# Patient Record
Sex: MALE | Race: WHITE | Employment: OTHER | URBAN - NONMETROPOLITAN AREA
[De-identification: names, ages, dates, MRNs, and addresses within clinical notes are randomized per-mention and may not be internally consistent; named-entity substitution may affect disease eponyms.]

---

## 2020-02-24 ENCOUNTER — HOSPITAL ENCOUNTER (OUTPATIENT)
Dept: GENERAL RADIOLOGY | Age: 85
Discharge: HOME | End: 2020-02-24
Payer: MEDICARE

## 2020-02-24 ENCOUNTER — HOSPITAL ENCOUNTER (OUTPATIENT)
Age: 85
Discharge: HOME | End: 2020-02-24
Payer: MEDICARE

## 2020-02-24 ENCOUNTER — OFFICE VISIT (OUTPATIENT)
Dept: CARDIOLOGY CLINIC | Age: 85
End: 2020-02-24
Payer: MEDICARE

## 2020-02-24 VITALS
DIASTOLIC BLOOD PRESSURE: 80 MMHG | HEIGHT: 74 IN | HEART RATE: 97 BPM | BODY MASS INDEX: 26.15 KG/M2 | SYSTOLIC BLOOD PRESSURE: 142 MMHG | WEIGHT: 203.8 LBS

## 2020-02-24 PROCEDURE — 99204 OFFICE O/P NEW MOD 45 MIN: CPT | Performed by: NUCLEAR MEDICINE

## 2020-02-24 PROCEDURE — 93000 ELECTROCARDIOGRAM COMPLETE: CPT | Performed by: NUCLEAR MEDICINE

## 2020-02-24 PROCEDURE — 71046 X-RAY EXAM CHEST 2 VIEWS: CPT

## 2020-02-24 RX ORDER — LEVOTHYROXINE SODIUM 50 UG/1
TABLET ORAL
COMMUNITY
Start: 2020-02-10

## 2020-02-24 ASSESSMENT — ENCOUNTER SYMPTOMS
ANAL BLEEDING: 0
BACK PAIN: 0
VOMITING: 0
PHOTOPHOBIA: 0
BLOOD IN STOOL: 0
SHORTNESS OF BREATH: 1
RECTAL PAIN: 0
DIARRHEA: 0
COLOR CHANGE: 0
ABDOMINAL DISTENTION: 0
CONSTIPATION: 0
NAUSEA: 0
CHEST TIGHTNESS: 1
ABDOMINAL PAIN: 0

## 2020-02-24 NOTE — PROGRESS NOTES
C/o occasional left side chest pain- pain 1/10, SOB, palpitations, fatigue, freq cough with white secretions increases after her eats    Pt Denies  HA, Dizziness, Swelling

## 2020-02-24 NOTE — PROGRESS NOTES
05/06/2002    Flu vaccine (1) 09/01/2019    Hepatitis A vaccine  Aged Out    Hepatitis B vaccine  Aged Out    Hib vaccine  Aged Out    Meningococcal (ACWY) vaccine  Aged Out       Subjective:  Review of Systems   Constitutional: Positive for fatigue. HENT: Negative for ear pain and nosebleeds. Eyes: Negative for photophobia. Respiratory: Positive for chest tightness and shortness of breath. Cardiovascular: Positive for chest pain. Negative for palpitations. Gastrointestinal: Negative for abdominal distention, abdominal pain, anal bleeding, blood in stool, constipation, diarrhea, nausea, rectal pain and vomiting. Endocrine: Negative for polyphagia. Genitourinary: Negative for dysuria, frequency and urgency. Musculoskeletal: Negative for arthralgias, back pain, gait problem, joint swelling, myalgias, neck pain and neck stiffness. Skin: Negative for color change, pallor, rash and wound. Allergic/Immunologic: Negative for food allergies. Neurological: Positive for dizziness. Negative for syncope and light-headedness. Hematological: Negative for adenopathy. Psychiatric/Behavioral: Negative for behavioral problems, confusion, decreased concentration, dysphoric mood and hallucinations. The patient is not hyperactive. Objective:  Physical Exam  HENT:      Head: Normocephalic. Nose: Nose normal.   Eyes:      Pupils: Pupils are equal, round, and reactive to light. Cardiovascular:      Rate and Rhythm: Normal rate and regular rhythm. Heart sounds: Murmur present. Pulmonary:      Effort: Pulmonary effort is normal. No respiratory distress. Breath sounds: Normal breath sounds. No stridor. No wheezing, rhonchi or rales. Chest:      Chest wall: No tenderness. Abdominal:      General: There is no distension. Palpations: There is no mass. Tenderness: There is no abdominal tenderness. There is no right CVA tenderness, left CVA tenderness, guarding or rebound. Hernia: No hernia is present. Musculoskeletal:         General: No swelling, tenderness, deformity or signs of injury. Right lower leg: No edema. Left lower leg: No edema. Skin:     Coloration: Skin is not jaundiced or pale. Findings: No bruising, erythema, lesion or rash. Neurological:      General: No focal deficit present. Mental Status: He is oriented to person, place, and time. Cranial Nerves: No cranial nerve deficit. Sensory: No sensory deficit. Motor: No weakness. Coordination: Coordination normal.      Gait: Gait normal.      Deep Tendon Reflexes: Reflexes normal.   Psychiatric:         Mood and Affect: Mood normal.         Thought Content: Thought content normal.         Judgment: Judgment normal.       BP (!) 156/100   Pulse 97   Ht 6' 2\" (1.88 m)   Wt 203 lb 12.8 oz (92.4 kg)   BMI 26.17 kg/m²     Assessment:      Diagnosis Orders   1. SOB (shortness of breath)  EKG 12 lead   2. Essential hypertension     concerning patient  Higher risk patient  Dyspnea as above  Previous abnormal stress test   Higher BP   ECG in office was done today. I reviewed the ECG. No acute findings      Plan:  No follow-ups on file. Discussed  Add beta blockers   Non invasive cardiac testing will be ordered to further evaluate for any ischemic or structural heart disease as a cause of the patient symptoms. We will proceed with a Stress Cardiolite test and echo soon. Cant walk a treadmill due to knee issues  ? ? Pulmonary   Does have decreased air entry   CXR   Continue risk factor modification and medical management  Thank you for allowing me to participate in the care of your patient. Please don't hesitate to contact me regarding any further issues related to the patient care    Orders Placed:  Orders Placed This Encounter   Procedures    EKG 12 lead     Order Specific Question:   Reason for Exam?     Answer:    Other       Medications Prescribed:  No orders of the defined

## 2020-03-06 ENCOUNTER — HOSPITAL ENCOUNTER (OUTPATIENT)
Dept: NON INVASIVE DIAGNOSTICS | Age: 85
Discharge: HOME | End: 2020-03-06
Payer: MEDICARE

## 2020-03-06 LAB
LV EF: 53 %
LVEF MODALITY: NORMAL

## 2020-03-06 PROCEDURE — 93017 CV STRESS TEST TRACING ONLY: CPT

## 2020-03-06 PROCEDURE — 93306 TTE W/DOPPLER COMPLETE: CPT

## 2020-03-06 PROCEDURE — 3430000000 HC RX DIAGNOSTIC RADIOPHARMACEUTICAL: Performed by: NUCLEAR MEDICINE

## 2020-03-06 PROCEDURE — 6360000002 HC RX W HCPCS

## 2020-03-06 PROCEDURE — 78452 HT MUSCLE IMAGE SPECT MULT: CPT

## 2020-03-06 PROCEDURE — A9500 TC99M SESTAMIBI: HCPCS | Performed by: NUCLEAR MEDICINE

## 2020-03-06 RX ADMIN — Medication 31.1 MILLICURIE: at 13:00

## 2020-03-06 RX ADMIN — Medication 8.7 MILLICURIE: at 11:55

## 2020-03-09 ENCOUNTER — TELEPHONE (OUTPATIENT)
Dept: CARDIOLOGY CLINIC | Age: 85
End: 2020-03-09

## 2020-03-19 ENCOUNTER — TELEPHONE (OUTPATIENT)
Dept: CARDIOLOGY CLINIC | Age: 85
End: 2020-03-19

## 2020-03-19 NOTE — TELEPHONE ENCOUNTER
I never received the review stress test on him  He was abnormal   I should have seen this a week ago !!!  Needs a cath  See if we can schedule the week I come back

## 2020-03-25 RX ORDER — OMEPRAZOLE 40 MG/1
40 CAPSULE, DELAYED RELEASE ORAL DAILY
COMMUNITY
End: 2020-05-28

## 2020-03-31 ENCOUNTER — PREP FOR PROCEDURE (OUTPATIENT)
Dept: CARDIOLOGY | Age: 85
End: 2020-03-31

## 2020-03-31 RX ORDER — SODIUM CHLORIDE 0.9 % (FLUSH) 0.9 %
10 SYRINGE (ML) INJECTION PRN
Status: CANCELLED | OUTPATIENT
Start: 2020-03-31

## 2020-03-31 RX ORDER — SODIUM CHLORIDE 0.9 % (FLUSH) 0.9 %
10 SYRINGE (ML) INJECTION EVERY 12 HOURS SCHEDULED
Status: CANCELLED | OUTPATIENT
Start: 2020-03-31

## 2020-03-31 RX ORDER — SODIUM CHLORIDE 9 MG/ML
INJECTION, SOLUTION INTRAVENOUS CONTINUOUS
Status: CANCELLED | OUTPATIENT
Start: 2020-03-31

## 2020-03-31 RX ORDER — DIPHENHYDRAMINE HYDROCHLORIDE 50 MG/ML
50 INJECTION INTRAMUSCULAR; INTRAVENOUS ONCE
Status: CANCELLED | OUTPATIENT
Start: 2020-03-31 | End: 2020-03-31

## 2020-03-31 RX ORDER — ASPIRIN 325 MG
325 TABLET ORAL ONCE
Status: CANCELLED | OUTPATIENT
Start: 2020-03-31 | End: 2020-03-31

## 2020-03-31 RX ORDER — NITROGLYCERIN 0.4 MG/1
0.4 TABLET SUBLINGUAL EVERY 5 MIN PRN
Status: CANCELLED | OUTPATIENT
Start: 2020-03-31

## 2020-04-01 ENCOUNTER — HOSPITAL ENCOUNTER (OUTPATIENT)
Dept: INPATIENT UNIT | Age: 85
Discharge: HOME | End: 2020-04-01
Attending: NUCLEAR MEDICINE | Admitting: NUCLEAR MEDICINE
Payer: MEDICARE

## 2020-04-01 VITALS
BODY MASS INDEX: 25.93 KG/M2 | HEIGHT: 74 IN | SYSTOLIC BLOOD PRESSURE: 104 MMHG | TEMPERATURE: 97.6 F | DIASTOLIC BLOOD PRESSURE: 54 MMHG | RESPIRATION RATE: 18 BRPM | WEIGHT: 202 LBS | HEART RATE: 55 BPM | OXYGEN SATURATION: 96 %

## 2020-04-01 LAB
ABO: NORMAL
ANION GAP SERPL CALCULATED.3IONS-SCNC: 17 MEQ/L (ref 8–16)
ANTIBODY SCREEN: NORMAL
APTT: 32.5 SECONDS (ref 22–38)
BUN BLDV-MCNC: 23 MG/DL (ref 7–22)
CALCIUM SERPL-MCNC: 10 MG/DL (ref 8.5–10.5)
CHLORIDE BLD-SCNC: 100 MEQ/L (ref 98–111)
CHOLESTEROL, TOTAL: 182 MG/DL (ref 100–199)
CO2: 24 MEQ/L (ref 23–33)
CREAT SERPL-MCNC: 1.5 MG/DL (ref 0.4–1.2)
EKG ATRIAL RATE: 62 BPM
EKG P AXIS: 49 DEGREES
EKG P-R INTERVAL: 150 MS
EKG Q-T INTERVAL: 434 MS
EKG QRS DURATION: 106 MS
EKG QTC CALCULATION (BAZETT): 440 MS
EKG R AXIS: -44 DEGREES
EKG T AXIS: 44 DEGREES
EKG VENTRICULAR RATE: 62 BPM
ERYTHROCYTE [DISTWIDTH] IN BLOOD BY AUTOMATED COUNT: 14.4 % (ref 11.5–14.5)
ERYTHROCYTE [DISTWIDTH] IN BLOOD BY AUTOMATED COUNT: 47.7 FL (ref 35–45)
GFR SERPL CREATININE-BSD FRML MDRD: 45 ML/MIN/1.73M2
GLUCOSE BLD-MCNC: 162 MG/DL (ref 70–108)
HCT VFR BLD CALC: 53.7 % (ref 42–52)
HDLC SERPL-MCNC: 46 MG/DL
HEMOGLOBIN: 17.2 GM/DL (ref 14–18)
INR BLD: 0.99 (ref 0.85–1.13)
LDL CHOLESTEROL CALCULATED: 114 MG/DL
MCH RBC QN AUTO: 29.1 PG (ref 26–33)
MCHC RBC AUTO-ENTMCNC: 32 GM/DL (ref 32.2–35.5)
MCV RBC AUTO: 90.7 FL (ref 80–94)
PLATELET # BLD: 195 THOU/MM3 (ref 130–400)
PMV BLD AUTO: 9.4 FL (ref 9.4–12.4)
POTASSIUM REFLEX MAGNESIUM: 4.5 MEQ/L (ref 3.5–5.2)
RBC # BLD: 5.92 MILL/MM3 (ref 4.7–6.1)
RH FACTOR: NORMAL
SODIUM BLD-SCNC: 141 MEQ/L (ref 135–145)
TRIGL SERPL-MCNC: 109 MG/DL (ref 0–199)
WBC # BLD: 9.7 THOU/MM3 (ref 4.8–10.8)

## 2020-04-01 PROCEDURE — 93010 ELECTROCARDIOGRAM REPORT: CPT | Performed by: NUCLEAR MEDICINE

## 2020-04-01 PROCEDURE — 85027 COMPLETE CBC AUTOMATED: CPT

## 2020-04-01 PROCEDURE — 2709999900 HC NON-CHARGEABLE SUPPLY

## 2020-04-01 PROCEDURE — 85730 THROMBOPLASTIN TIME PARTIAL: CPT

## 2020-04-01 PROCEDURE — C1725 CATH, TRANSLUMIN NON-LASER: HCPCS

## 2020-04-01 PROCEDURE — 93458 L HRT ARTERY/VENTRICLE ANGIO: CPT | Performed by: NUCLEAR MEDICINE

## 2020-04-01 PROCEDURE — 2500000003 HC RX 250 WO HCPCS

## 2020-04-01 PROCEDURE — C1894 INTRO/SHEATH, NON-LASER: HCPCS

## 2020-04-01 PROCEDURE — 6360000002 HC RX W HCPCS

## 2020-04-01 PROCEDURE — 36415 COLL VENOUS BLD VENIPUNCTURE: CPT

## 2020-04-01 PROCEDURE — 86901 BLOOD TYPING SEROLOGIC RH(D): CPT

## 2020-04-01 PROCEDURE — 86850 RBC ANTIBODY SCREEN: CPT

## 2020-04-01 PROCEDURE — 6370000000 HC RX 637 (ALT 250 FOR IP): Performed by: NURSE PRACTITIONER

## 2020-04-01 PROCEDURE — 93005 ELECTROCARDIOGRAM TRACING: CPT | Performed by: NURSE PRACTITIONER

## 2020-04-01 PROCEDURE — 85610 PROTHROMBIN TIME: CPT

## 2020-04-01 PROCEDURE — C1769 GUIDE WIRE: HCPCS

## 2020-04-01 PROCEDURE — 80048 BASIC METABOLIC PNL TOTAL CA: CPT

## 2020-04-01 PROCEDURE — 86900 BLOOD TYPING SEROLOGIC ABO: CPT

## 2020-04-01 PROCEDURE — 6360000004 HC RX CONTRAST MEDICATION: Performed by: NUCLEAR MEDICINE

## 2020-04-01 PROCEDURE — 80061 LIPID PANEL: CPT

## 2020-04-01 PROCEDURE — 2580000003 HC RX 258: Performed by: NURSE PRACTITIONER

## 2020-04-01 RX ORDER — NITROGLYCERIN 0.4 MG/1
0.4 TABLET SUBLINGUAL EVERY 5 MIN PRN
Status: DISCONTINUED | OUTPATIENT
Start: 2020-04-01 | End: 2020-04-01 | Stop reason: HOSPADM

## 2020-04-01 RX ORDER — ASPIRIN 325 MG
325 TABLET ORAL ONCE
Status: COMPLETED | OUTPATIENT
Start: 2020-04-01 | End: 2020-04-01

## 2020-04-01 RX ORDER — ATROPINE SULFATE 0.4 MG/ML
0.5 AMPUL (ML) INJECTION
Status: DISCONTINUED | OUTPATIENT
Start: 2020-04-01 | End: 2020-04-01 | Stop reason: HOSPADM

## 2020-04-01 RX ORDER — SODIUM CHLORIDE 9 MG/ML
INJECTION, SOLUTION INTRAVENOUS CONTINUOUS
Status: DISCONTINUED | OUTPATIENT
Start: 2020-04-01 | End: 2020-04-01 | Stop reason: HOSPADM

## 2020-04-01 RX ORDER — ACETAMINOPHEN 325 MG/1
650 TABLET ORAL EVERY 4 HOURS PRN
Status: DISCONTINUED | OUTPATIENT
Start: 2020-04-01 | End: 2020-04-01 | Stop reason: HOSPADM

## 2020-04-01 RX ORDER — SODIUM CHLORIDE 0.9 % (FLUSH) 0.9 %
10 SYRINGE (ML) INJECTION PRN
Status: DISCONTINUED | OUTPATIENT
Start: 2020-04-01 | End: 2020-04-01 | Stop reason: SDUPTHER

## 2020-04-01 RX ORDER — SODIUM CHLORIDE 0.9 % (FLUSH) 0.9 %
10 SYRINGE (ML) INJECTION EVERY 12 HOURS SCHEDULED
Status: DISCONTINUED | OUTPATIENT
Start: 2020-04-01 | End: 2020-04-01 | Stop reason: HOSPADM

## 2020-04-01 RX ORDER — SODIUM CHLORIDE 9 MG/ML
INJECTION, SOLUTION INTRAVENOUS CONTINUOUS
Status: DISCONTINUED | OUTPATIENT
Start: 2020-04-01 | End: 2020-04-01 | Stop reason: ALTCHOICE

## 2020-04-01 RX ORDER — SODIUM CHLORIDE 0.9 % (FLUSH) 0.9 %
10 SYRINGE (ML) INJECTION PRN
Status: DISCONTINUED | OUTPATIENT
Start: 2020-04-01 | End: 2020-04-01 | Stop reason: HOSPADM

## 2020-04-01 RX ADMIN — IOPAMIDOL 135 ML: 755 INJECTION, SOLUTION INTRAVENOUS at 14:00

## 2020-04-01 RX ADMIN — SODIUM CHLORIDE: 9 INJECTION, SOLUTION INTRAVENOUS at 11:09

## 2020-04-01 RX ADMIN — ASPIRIN 325 MG: 325 TABLET, FILM COATED ORAL at 11:25

## 2020-04-01 NOTE — PLAN OF CARE
Problem: Discharge Planning:  Goal: Participates in care planning  Description: Participates in care planning  Outcome: Ongoing   Care plan reviewed with patient and wife. Patient and wife verbalize understanding of the plan of care and contribute to goal setting.

## 2020-04-01 NOTE — PROGRESS NOTES
2 ml air withdrawn from vasc band - small amt of bleeding occurred. 2 ml air reinjected into vasc band with cessaton of bleeding.   Site soft, no hematoma

## 2020-05-28 ENCOUNTER — OFFICE VISIT (OUTPATIENT)
Dept: CARDIOLOGY CLINIC | Age: 85
End: 2020-05-28
Payer: MEDICARE

## 2020-05-28 VITALS
BODY MASS INDEX: 26.69 KG/M2 | WEIGHT: 208 LBS | SYSTOLIC BLOOD PRESSURE: 132 MMHG | HEIGHT: 74 IN | HEART RATE: 68 BPM | DIASTOLIC BLOOD PRESSURE: 80 MMHG

## 2020-05-28 PROCEDURE — 99213 OFFICE O/P EST LOW 20 MIN: CPT | Performed by: NUCLEAR MEDICINE

## 2020-05-28 NOTE — PROGRESS NOTES
[address] [address]  Dept: [phone]  Dept Fax: [phone]  Loc: [phone]    Visit Date: 2020    Zandra Cole is a 82 y.o. male who presents todayfor:  Chief Complaint   Patient presents with    Check-Up    Shortness of Breath    Coronary Artery Disease   known CAD  Cath lately   Mild non obstructive CAD  Issues with sleeping disturbance from meds   No cath complication  No bleeding       HPI:  HPI  Past Medical History:   Diagnosis Date    Acid reflux 2019    Arthritis     general    Diabetes mellitus ([address]     Hypertension     SOB (shortness of breath)     Thyroid disease       Past Surgical History:   Procedure Laterality Date    DENTAL SURGERY      FINGER SURGERY      NECK SURGERY       Family History   Problem Relation Age of Onset    Cancer Mother     Emphysema Father     Heart Disease Brother     Cancer Brother      Social History     Tobacco Use    Smoking status: Former Smoker     Last attempt to quit: 1980     Years since quittin.4    Smokeless tobacco: Never Used   Substance Use Topics    Alcohol use: Not Currently     Comment: rare      Current Outpatient Medications   Medication Sig Dispense Refill    Carboxymethylcellulose Sodium (EYE DROPS OP) Apply to eye      EUTHYROX 50 MCG tablet TAKE 1 TABLET BY MOUTH ONCE DAILY IN THE MORNING ON AN EMPTY STOMACH      metoprolol tartrate (LOPRESSOR) 25 MG tablet Take 1 tablet by mouth 2 times daily 60 tablet 11     No current facility-administered medications for this visit.       No Known Allergies  Health Maintenance   Topic Date Due    DTaP/Tdap/Td vaccine (1 - Tdap) 1956    Shingles Vaccine (1 of 2) 1987    Pneumococcal 65+ years Vaccine (1 of 1 - PPSV23) 2002    Annual Wellness Visit (AWV)  2020    Flu vaccine (Season Ended) 2020    Hepatitis A vaccine  Aged Out    Hepatitis B vaccine  Aged Out    Hib vaccine  Aged Out    Meningococcal (ACWY) vaccine  Aged Out       Subjective:  Review of Systems  General:   No fever, no chills, No fatigue or weight loss  Pulmonary:    some dyspnea, no wheezing  Cardiac:    Denies recent chest pain,   GI:     No nausea or vomiting, no abdominal pain  Neuro:    No dizziness or light headedness,   Musculoskeletal:  No recent active issues  Extremities:   No edema, no obvious claudication       Objective:  Physical Exam  /80   Pulse 68   Ht 6' 2\" (1.88 m)   Wt 208 lb (94.3 kg)   BMI 26.71 kg/m²   General:   Well developed, well nourished  Lungs:   Clear to auscultation  Heart:    Normal S1 S2, Slight murmur. no rubs, no gallops  Abdomen:   Soft, non tender, no organomegalies, positive bowel sounds  Extremities:   No edema, no cyanosis, good peripheral pulses  Neurological:   Awake, alert, oriented. No obvious focal deficits  Musculoskelatal:  No obvious deformities    Assessment:      Diagnosis Orders   1. Coronary artery disease involving native coronary artery of native heart without angina pectoris     2. Familial hypercholesterolemia     cardiac fair now     Plan:  No follow-ups on file. Change toprol or atenolol for once a day   Continue risk factor modification and medical management  Thank you for allowing me to participate in the care of your patient. Please don't hesitate to contact me regarding any further issues related to the patient care    Orders Placed:  No orders of the defined types were placed in this encounter. Medications Prescribed:  No orders of the defined types were placed in this encounter. Discussed use, benefit, and side effects of prescribed medications. All patient questions answered. Pt voicedunderstanding. Instructed to continue current medications, diet and exercise. Continue risk factor modification and medical management. Patient agreed with treatment plan. Follow up as directed.     Electronically

## 2020-05-28 NOTE — PROGRESS NOTES
Needs cataract removed on 6-8-2020 with Dr. Vincenzo Wilson. Patient's BP before eye surgery was 168/104 and 175/108.

## 2020-06-01 ENCOUNTER — TELEPHONE (OUTPATIENT)
Dept: CARDIOLOGY CLINIC | Age: 85
End: 2020-06-01

## 2020-07-20 ENCOUNTER — TELEPHONE (OUTPATIENT)
Dept: CARDIOLOGY CLINIC | Age: 85
End: 2020-07-20

## 2021-05-28 ENCOUNTER — OFFICE VISIT (OUTPATIENT)
Dept: CARDIOLOGY CLINIC | Age: 86
End: 2021-05-28
Payer: MEDICARE

## 2021-05-28 VITALS
BODY MASS INDEX: 27.11 KG/M2 | WEIGHT: 211.2 LBS | DIASTOLIC BLOOD PRESSURE: 88 MMHG | SYSTOLIC BLOOD PRESSURE: 157 MMHG | HEIGHT: 74 IN | HEART RATE: 60 BPM

## 2021-05-28 DIAGNOSIS — R06.02 SOB (SHORTNESS OF BREATH) ON EXERTION: ICD-10-CM

## 2021-05-28 DIAGNOSIS — I25.10 CORONARY ARTERY DISEASE INVOLVING NATIVE CORONARY ARTERY OF NATIVE HEART WITHOUT ANGINA PECTORIS: Primary | ICD-10-CM

## 2021-05-28 PROCEDURE — 93000 ELECTROCARDIOGRAM COMPLETE: CPT | Performed by: NUCLEAR MEDICINE

## 2021-05-28 PROCEDURE — 99213 OFFICE O/P EST LOW 20 MIN: CPT | Performed by: NUCLEAR MEDICINE

## 2021-05-28 RX ORDER — FAMOTIDINE 40 MG/1
40 TABLET, FILM COATED ORAL DAILY
COMMUNITY
End: 2022-05-27

## 2022-05-27 ENCOUNTER — OFFICE VISIT (OUTPATIENT)
Dept: CARDIOLOGY CLINIC | Age: 87
End: 2022-05-27
Payer: MEDICARE

## 2022-05-27 VITALS
BODY MASS INDEX: 26.69 KG/M2 | DIASTOLIC BLOOD PRESSURE: 95 MMHG | HEART RATE: 68 BPM | HEIGHT: 74 IN | SYSTOLIC BLOOD PRESSURE: 150 MMHG | WEIGHT: 208 LBS

## 2022-05-27 DIAGNOSIS — I10 PRIMARY HYPERTENSION: ICD-10-CM

## 2022-05-27 DIAGNOSIS — R06.02 SOB (SHORTNESS OF BREATH) ON EXERTION: ICD-10-CM

## 2022-05-27 DIAGNOSIS — I25.10 CORONARY ARTERY DISEASE INVOLVING NATIVE CORONARY ARTERY OF NATIVE HEART WITHOUT ANGINA PECTORIS: Primary | ICD-10-CM

## 2022-05-27 PROCEDURE — 1123F ACP DISCUSS/DSCN MKR DOCD: CPT | Performed by: NUCLEAR MEDICINE

## 2022-05-27 PROCEDURE — 99213 OFFICE O/P EST LOW 20 MIN: CPT | Performed by: NUCLEAR MEDICINE

## 2022-05-27 PROCEDURE — 93000 ELECTROCARDIOGRAM COMPLETE: CPT | Performed by: NUCLEAR MEDICINE

## 2022-05-27 RX ORDER — AZITHROMYCIN 250 MG/1
TABLET, FILM COATED ORAL
COMMUNITY
Start: 2022-05-25

## 2022-05-27 NOTE — PROGRESS NOTES
[id number] [facility] [address] [address]  Dept: [phone]  Dept Fax: [phone]  Loc: [phone]    Visit Date: 2022    Rosana Lesches is a 82 y.o. male who presents todayfor:  Chief Complaint   Patient presents with    1 Year Follow Up    Hypertension    Coronary Artery Disease     Know mild CAD  No chest pain   Some baseline dyspnea  No other issues  BP is stable  No dizziness  No syncope      HPI:  HPI  Past Medical History:   Diagnosis Date    Acid reflux 2019    Arthritis     general    Diabetes mellitus ([address]     Hypertension     SOB (shortness of breath)     Thyroid disease       Past Surgical History:   Procedure Laterality Date    DENTAL SURGERY      FINGER SURGERY      NECK SURGERY       Family History   Problem Relation Age of Onset    Cancer Mother     Emphysema Father     Heart Disease Brother     Cancer Brother      Social History     Tobacco Use    Smoking status: Former Smoker     Quit date: 1980     Years since quittin.4    Smokeless tobacco: Never Used   Substance Use Topics    Alcohol use: Not Currently     Comment: rare      Current Outpatient Medications   Medication Sig Dispense Refill    Lactobacillus (PROBIOTIC ACIDOPHILUS PO) Take by mouth      azithromycin (ZITHROMAX) 250 MG tablet TAKE 2 TABLETS BY MOUTH ON DAY 1, THEN TAKE 1 TABLET DAILY ON DAYS 2-5      metoprolol tartrate (LOPRESSOR) 25 MG tablet Take 1 tablet by mouth 2 times daily 60 tablet 11    EUTHYROX 50 MCG tablet TAKE 1 TABLET BY MOUTH ONCE DAILY IN THE MORNING ON AN EMPTY STOMACH       No current facility-administered medications for this visit.      No Known Allergies  Health Maintenance   Topic Date Due    Annual Wellness Visit (AWV)  Never done    COVID-19 Vaccine (1) Never done    Depression Screen  Never done    DTaP/Tdap/Td vaccine (1 - Tdap) Never done    Shingles vaccine (1 of 2) Never done  Pneumococcal 65+ years Vaccine (1 - PCV) Never done    Flu vaccine (Season Ended) 09/01/2022    Hepatitis A vaccine  Aged Out    Hepatitis B vaccine  Aged Out    Hib vaccine  Aged Out    Meningococcal (ACWY) vaccine  Aged Out       Subjective:  Review of Systems  General:   No fever, no chills, No fatigue or weight loss  Pulmonary:    No dyspnea, no wheezing  Cardiac:    Denies recent chest pain,   GI:     No nausea or vomiting, no abdominal pain  Neuro:    No dizziness or light headedness,   Musculoskeletal:  No recent active issues  Extremities:   No edema, no obvious claudication       Objective:  Physical Exam  BP (!) 150/95   Pulse 68   Ht 6' 2\" (1.88 m)   Wt 208 lb (94.3 kg)   BMI 26.71 kg/m²   General:   Well developed, well nourished  Lungs:   Clear to auscultation  Heart:    Normal S1 S2, Slight murmur. no rubs, no gallops  Abdomen:   Soft, non tender, no organomegalies, positive bowel sounds  Extremities:   No edema, no cyanosis, good peripheral pulses  Neurological:   Awake, alert, oriented. No obvious focal deficits  Musculoskelatal:  No obvious deformities    Assessment:      Diagnosis Orders   1. Coronary artery disease involving native coronary artery of native heart without angina pectoris  EKG 12 lead   2. SOB (shortness of breath) on exertion  EKG 12 lead   3. Primary hypertension     as above  Cardiac fair for now  ECG in office was done today. I reviewed the ECG. No acute findings    Plan:  As above  Continue risk factor modification and medical management  Thank you for allowing me to participate in the care of your patient. Please don't hesitate to contact me regarding any further issues related to the patient care      Orders Placed:  Orders Placed This Encounter   Procedures    EKG 12 lead     Order Specific Question:   Reason for Exam?     Answer: Other       Medications Prescribed:  No orders of the defined types were placed in this encounter.          Discussed use, benefit, and side effects of prescribed medications. All patient questions answered. Pt voicedunderstanding. Instructed to continue current medications, diet and exercise. Continue risk factor modification and medical management. Patient agreed with treatment plan. Follow up as directed.     Electronically signedby Sol Kruger MD on 5/27/2022 at 12:47 PM

## 2023-06-02 ENCOUNTER — OFFICE VISIT (OUTPATIENT)
Dept: CARDIOLOGY CLINIC | Age: 88
End: 2023-06-02
Payer: MEDICARE

## 2023-06-02 VITALS
HEART RATE: 61 BPM | DIASTOLIC BLOOD PRESSURE: 97 MMHG | WEIGHT: 206 LBS | BODY MASS INDEX: 26.44 KG/M2 | SYSTOLIC BLOOD PRESSURE: 163 MMHG | HEIGHT: 74 IN

## 2023-06-02 DIAGNOSIS — I10 PRIMARY HYPERTENSION: ICD-10-CM

## 2023-06-02 DIAGNOSIS — R06.02 SOB (SHORTNESS OF BREATH) ON EXERTION: ICD-10-CM

## 2023-06-02 DIAGNOSIS — I25.10 CORONARY ARTERY DISEASE INVOLVING NATIVE CORONARY ARTERY OF NATIVE HEART WITHOUT ANGINA PECTORIS: Primary | ICD-10-CM

## 2023-06-02 PROCEDURE — 1123F ACP DISCUSS/DSCN MKR DOCD: CPT | Performed by: NUCLEAR MEDICINE

## 2023-06-02 PROCEDURE — 99213 OFFICE O/P EST LOW 20 MIN: CPT | Performed by: NUCLEAR MEDICINE

## 2023-06-02 PROCEDURE — 93000 ELECTROCARDIOGRAM COMPLETE: CPT | Performed by: NUCLEAR MEDICINE

## 2023-06-02 NOTE — PROGRESS NOTES
[id number] [facility] .  [address] [address]  Dept: [phone]  Dept Fax: [phone]  Loc: [phone]    Visit Date: 2023    Gloria Machado is a 82 y.o. male who presents todayfor:  Chief Complaint   Patient presents with    1 Year Follow Up    Coronary Artery Disease    Hypertension     Know mild CAD  No chest pain  No changes in breathing  BP is stable  No dizziness  No syncope      HPI:  HPI  Past Medical History:   Diagnosis Date    Acid reflux 2019    Arthritis     general    Diabetes mellitus (HCC)     Hypertension     SOB (shortness of breath)     Thyroid disease       Past Surgical History:   Procedure Laterality Date    DENTAL SURGERY      FINGER SURGERY      NECK SURGERY       Family History   Problem Relation Age of Onset    Cancer Mother     Emphysema Father     Heart Disease Brother     Cancer Brother      Social History     Tobacco Use    Smoking status: Former     Types: Cigarettes     Quit date: 1980     Years since quittin.4    Smokeless tobacco: Never   Substance Use Topics    Alcohol use: Not Currently     Comment: rare      Current Outpatient Medications   Medication Sig Dispense Refill    metoprolol tartrate (LOPRESSOR) 25 MG tablet Take 1 tablet by mouth 2 times daily 60 tablet 11    Lactobacillus (PROBIOTIC ACIDOPHILUS PO) Take by mouth      azithromycin (ZITHROMAX) 250 MG tablet TAKE 2 TABLETS BY MOUTH ON DAY 1, THEN TAKE 1 TABLET DAILY ON DAYS 2-5      EUTHYROX 50 MCG tablet TAKE 1 TABLET BY MOUTH ONCE DAILY IN THE MORNING ON AN EMPTY STOMACH       No current facility-administered medications for this visit.      No Known Allergies  Health Maintenance   Topic Date Due    COVID-19 Vaccine (1) Never done    Depression Screen  Never done    DTaP/Tdap/Td vaccine (1 - Tdap) Never done    Shingles vaccine (1 of 2) Never done    Pneumococcal 65+ years Vaccine (1 - PCV) Never done    Annual Wellness Visit

## 2024-06-04 NOTE — PROGRESS NOTES
Bed: 12  Expected date:   Expected time:   Means of arrival:   Comments:  Lou Vickers    done    Pneumococcal 65+ years Vaccine (1 of 1 - PPSV23) Never done   [employer] Visit (AWV)  Never done    Flu vaccine (Season Ended) 09/01/2021    Hepatitis A vaccine  Aged Out    Hepatitis B vaccine  Aged Out    Hib vaccine  Aged Out    Meningococcal (ACWY) vaccine  Aged Out       Subjective:  Review of Systems  General:   No fever, no chills, No fatigue or weight loss  Pulmonary:    No dyspnea, no wheezing  Cardiac:    Denies recent chest pain,   GI:     No nausea or vomiting, no abdominal pain  Neuro:    No dizziness or light headedness,   Musculoskeletal:  No recent active issues  Extremities:   No edema, no obvious claudication       Objective:  Physical Exam  BP (!) 157/88   Pulse 60   Ht 6' 2\" (1.88 m)   Wt 211 lb 3.2 oz (95.8 kg)   BMI 27.12 kg/m²   General:   Well developed, well nourished  Lungs:   Clear to auscultation  Heart:    Normal S1 S2, Slight murmur. no rubs, no gallops  Abdomen:   Soft, non tender, no organomegalies, positive bowel sounds  Extremities:   No edema, no cyanosis, good peripheral pulses  Neurological:   Awake, alert, oriented. No obvious focal deficits  Musculoskelatal:  No obvious deformities    Assessment:      Diagnosis Orders   1. Coronary artery disease involving native coronary artery of native heart without angina pectoris  EKG 12 lead   2. SOB (shortness of breath) on exertion  EKG 12 lead   as above  Cardiac fair for now   ECG in office was done today. I reviewed the ECG. No acute findings    Plan:  No follow-ups on file. As above  Continue risk factor modification and medical management  Thank you for allowing me to participate in the care of your patient. Please don't hesitate to contact me regarding any further issues related to the patient care    Orders Placed:  Orders Placed This Encounter   Procedures    EKG 12 lead     Order Specific Question:   Reason for Exam?     Answer:    Other       Medications Prescribed:  No orders of the defined types were placed in this encounter. Discussed use, benefit, and side effects of prescribed medications. All patient questions answered. Pt voicedunderstanding. Instructed to continue current medications, diet and exercise. Continue risk factor modification and medical management. Patient agreed with treatment plan. Follow up as directed.     Electronically signedby Laura Echavarria MD on 5/28/2021 at 12:59 PM

## 2024-06-07 ENCOUNTER — OFFICE VISIT (OUTPATIENT)
Dept: CARDIOLOGY CLINIC | Age: 89
End: 2024-06-07
Payer: MEDICARE

## 2024-06-07 VITALS
HEART RATE: 64 BPM | WEIGHT: 210.8 LBS | HEIGHT: 74 IN | SYSTOLIC BLOOD PRESSURE: 174 MMHG | BODY MASS INDEX: 27.05 KG/M2 | DIASTOLIC BLOOD PRESSURE: 88 MMHG

## 2024-06-07 DIAGNOSIS — R06.02 SOB (SHORTNESS OF BREATH) ON EXERTION: Primary | ICD-10-CM

## 2024-06-07 DIAGNOSIS — I10 PRIMARY HYPERTENSION: ICD-10-CM

## 2024-06-07 PROCEDURE — 93000 ELECTROCARDIOGRAM COMPLETE: CPT | Performed by: NUCLEAR MEDICINE

## 2024-06-07 PROCEDURE — G8419 CALC BMI OUT NRM PARAM NOF/U: HCPCS | Performed by: NUCLEAR MEDICINE

## 2024-06-07 PROCEDURE — 1036F TOBACCO NON-USER: CPT | Performed by: NUCLEAR MEDICINE

## 2024-06-07 PROCEDURE — 99213 OFFICE O/P EST LOW 20 MIN: CPT | Performed by: NUCLEAR MEDICINE

## 2024-06-07 PROCEDURE — 1123F ACP DISCUSS/DSCN MKR DOCD: CPT | Performed by: NUCLEAR MEDICINE

## 2024-06-07 PROCEDURE — G8427 DOCREV CUR MEDS BY ELIG CLIN: HCPCS | Performed by: NUCLEAR MEDICINE

## 2024-06-07 RX ORDER — PANTOPRAZOLE SODIUM 20 MG/1
20 TABLET, DELAYED RELEASE ORAL DAILY
COMMUNITY

## 2024-06-07 NOTE — PROGRESS NOTES
Has been taking his Metoprolol differently. States the BID was not tolerated so he is only taking 25mg once daily in the morning.     
medications for this visit.     No Known Allergies  Health Maintenance   Topic Date Due    COVID-19 Vaccine (1) Never done    Depression Screen  Never done    DTaP/Tdap/Td vaccine (1 - Tdap) Never done    Shingles vaccine (1 of 2) Never done    Respiratory Syncytial Virus (RSV) Pregnant or age 60 yrs+ (1 - 1-dose 60+ series) Never done    Pneumococcal 65+ years Vaccine (1 of 1 - PCV) Never done    Annual Wellness Visit (Medicare Advantage)  Never done    Flu vaccine (Season Ended) 08/01/2024    Hepatitis A vaccine  Aged Out    Hepatitis B vaccine  Aged Out    Hib vaccine  Aged Out    Polio vaccine  Aged Out    Meningococcal (ACWY) vaccine  Aged Out       Subjective:  General:   No fever, no chills, No fatigue or weight loss  Pulmonary:    No dyspnea, no wheezing  Cardiac:    Denies recent chest pain,   GI:     No nausea or vomiting, no abdominal pain  Neuro:    No dizziness or light headedness,   Musculoskeletal:  No recent active issues  Extremities:   No edema, no obvious claudication       Objective:  General:   Well developed, well nourished  Lungs:   Clear to auscultation  Heart:    Normal S1 S2, Slight murmur. no rubs, no gallops  Abdomen:   Soft, non tender, no organomegalies, positive bowel sounds  Extremities:   No edema, no cyanosis, good peripheral pulses  Neurological:   Awake, alert, oriented. No obvious focal deficits  Musculoskelatal:  No obvious deformities   BP (!) 174/88   Pulse 64   Ht 1.88 m (6' 2\")   Wt 95.6 kg (210 lb 12.8 oz)   BMI 27.07 kg/m²     Assessment:  Assessment & Plan    Diagnosis Orders   1. SOB (shortness of breath) on exertion  EKG 12 lead      2. Primary hypertension  EKG 12 lead      As above  Cardiac fair for now   ECG in office was done today. I reviewed the ECG. No acute findings    Plan:  No follow-ups on file.  As above  Continue risk factor modification and medical management  Thank you for allowing me to participate in the care of your patient. Please don't hesitate

## 2025-06-03 ENCOUNTER — OFFICE VISIT (OUTPATIENT)
Dept: CARDIOLOGY CLINIC | Age: 89
End: 2025-06-03
Payer: MEDICARE

## 2025-06-03 VITALS
WEIGHT: 194.3 LBS | HEIGHT: 74 IN | SYSTOLIC BLOOD PRESSURE: 136 MMHG | DIASTOLIC BLOOD PRESSURE: 68 MMHG | BODY MASS INDEX: 24.93 KG/M2 | HEART RATE: 77 BPM

## 2025-06-03 DIAGNOSIS — I10 PRIMARY HYPERTENSION: ICD-10-CM

## 2025-06-03 DIAGNOSIS — I25.10 CORONARY ARTERY DISEASE INVOLVING NATIVE CORONARY ARTERY OF NATIVE HEART WITHOUT ANGINA PECTORIS: Primary | ICD-10-CM

## 2025-06-03 PROCEDURE — 1159F MED LIST DOCD IN RCRD: CPT | Performed by: PHYSICIAN ASSISTANT

## 2025-06-03 PROCEDURE — 1036F TOBACCO NON-USER: CPT | Performed by: PHYSICIAN ASSISTANT

## 2025-06-03 PROCEDURE — 99214 OFFICE O/P EST MOD 30 MIN: CPT | Performed by: PHYSICIAN ASSISTANT

## 2025-06-03 PROCEDURE — 93000 ELECTROCARDIOGRAM COMPLETE: CPT | Performed by: PHYSICIAN ASSISTANT

## 2025-06-03 PROCEDURE — G8427 DOCREV CUR MEDS BY ELIG CLIN: HCPCS | Performed by: PHYSICIAN ASSISTANT

## 2025-06-03 PROCEDURE — 1123F ACP DISCUSS/DSCN MKR DOCD: CPT | Performed by: PHYSICIAN ASSISTANT

## 2025-06-03 PROCEDURE — G8420 CALC BMI NORM PARAMETERS: HCPCS | Performed by: PHYSICIAN ASSISTANT

## 2025-06-03 RX ORDER — ATORVASTATIN CALCIUM 20 MG/1
20 TABLET, FILM COATED ORAL DAILY
COMMUNITY
Start: 2025-05-13

## 2025-06-03 RX ORDER — METFORMIN HYDROCHLORIDE 750 MG/1
TABLET, EXTENDED RELEASE ORAL
COMMUNITY
Start: 2025-05-15

## 2025-06-04 NOTE — PROGRESS NOTES
Patient here for follow up.  EKG done today.  Patient complains of SOB.  Denies chest pain.   
[id number]  Job#: [id number]     Doc#: [id number]    CC:                 FASTING LIPID PANEL:  Lab Results   Component Value Date    CHOL 182 04/01/2020    HDL 46 04/01/2020    TRIG 109 04/01/2020          Diagnosis Orders   1. Coronary artery disease involving native coronary artery of native heart without angina pectoris        2. Primary hypertension              Above findings and plan of care were discussed with patient in details, patient's questions were answered.     Disposition:      Advised patient to call office or seek immediate medical attention if there is any new onset of  any chest pain, sob, palpitations, lightheadedness, dizziness, orthopnea, PND or pedal edema.       Assessment & Plan  1. Coronary Artery Disease:  - Continue current medication regimen: atorvastatin 20 mg once a day and metoprolol once a day.  - EKG shows no significant changes compared to the previous one.  - No reported chest pain or shortness of breath.    2. Hypertension:  - Blood pressure is well-controlled today.  - Continue current medication: metoprolol once a day.    3. Weight Management:  - Lost 15 pounds over the past three months, attributed to metformin.  - Continue metformin as it has helped manage weight and appetite.    Risks, benefits, and alternatives of continuing the current medication regimen were discussed. The importance of adherence to prescribed medications and lifestyle modifications was emphasized.    Follow-up:  - Follow up in 1 year with Dr. Thompson     The patient (or guardian, if applicable) and other individuals in attendance with the patient were advised that Artificial Intelligence will be utilized during this visit to record, process the conversation to generate a clinical note, and support improvement of the AI technology. The patient (or guardian, if applicable) and other individuals in attendance at the appointment consented to the use of AI, including the recording.                   Be Tubbs